# Patient Record
(demographics unavailable — no encounter records)

---

## 2025-06-13 NOTE — HISTORY OF PRESENT ILLNESS
[FreeTextEntry1] : Ms. Gold is a 49 year old female who is here for an evaluation of his rectal pain and occasional blood in stool for the last 3 months. Was seen at [facility] on 6/10/25 and was instructed to follow up with CRS for possible excision and biopsy.  Previously saw Dr. Jhaveri (GI) from [facility], who performed a sigmoidoscopy 6/10/25 and was advised to follow up with colorectal surgeon for surgical management.

## 2025-06-13 NOTE — HISTORY OF PRESENT ILLNESS
[FreeTextEntry1] : Ms. Gold is a 49 year old female who is here for an evaluation of his rectal pain and occasional blood in stool for the last 3 months. Was seen at [facility] on 6/10/25 and was instructed to follow up with CRS for possible excision and biopsy.  Previously saw Dr. Jhaveri (GI) from [facility], who performed a sigmoidoscopy 6/10/25 and was advised to follow up with colorectal surgeon for surgical management.

## 2025-06-13 NOTE — PHYSICAL EXAM
[Posterior] : posteriorly [Skin Tags] : residual hemorrhoidal skin tags were noted [Normal Breath Sounds] : Normal breath sounds [No Rash or Lesion] : No rash or lesion [Alert] : alert [Calm] : calm [Abdomen Masses] : No abdominal masses [Abdomen Tenderness] : ~T No ~M abdominal tenderness [JVD] : no jugular venous distention  [de-identified] : no acute distress

## 2025-06-13 NOTE — PHYSICAL EXAM
[Posterior] : posteriorly [Skin Tags] : residual hemorrhoidal skin tags were noted [Normal Breath Sounds] : Normal breath sounds [No Rash or Lesion] : No rash or lesion [Alert] : alert [Calm] : calm [Abdomen Masses] : No abdominal masses [Abdomen Tenderness] : ~T No ~M abdominal tenderness [JVD] : no jugular venous distention  [de-identified] : no acute distress

## 2025-06-13 NOTE — ASSESSMENT
[FreeTextEntry1] : Ms. Gold is a 49 year old female who is here for the evaluation of her rectal pain and bleeding. 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   Discussed management options for anal fissure including medical management with stool softeners, increased fluid intake, and topical compound cream with lidocaine and nifedipine. If medical management is not successful, surgical options include Botox injection for chemical denervation of the anal sphincter as well as lateral internal sphincterotomy.  Given the findings today on exam and chronicity of symptoms recommend starting with medical management of anal fissure Recommend daily stool softeners such as Colace, Senna or Dulcolax, 6-8 glasses of noncaffeinated beverages daily and twice daily application of lidocaine/nifedipine compound.  Should expect symptoms to improve over next two weeks. Initial medical treatment lasts 6-8 weeks, then will reassess for healing. May take two rounds of medical treatment to heal completely. If fissure has not healed at that point would recommend Botox treatment or lateral internal sphincterotomy.  Will return for follow up in 6-8 weeks to reassess. Discussed that if symptoms do not improve in first week or two should return sooner to readdress care and consider surgical options for quicker resolution.   
no

## 2025-06-13 NOTE — PHYSICAL EXAM
[Posterior] : posteriorly [Skin Tags] : residual hemorrhoidal skin tags were noted [Normal Breath Sounds] : Normal breath sounds [No Rash or Lesion] : No rash or lesion [Alert] : alert [Calm] : calm [Abdomen Masses] : No abdominal masses [Abdomen Tenderness] : ~T No ~M abdominal tenderness [JVD] : no jugular venous distention  [de-identified] : no acute distress

## 2025-06-13 NOTE — HISTORY OF PRESENT ILLNESS
[FreeTextEntry1] : Ms. Gold is a 49 year old female who is here for an evaluation of his rectal pain and occasional blood in stool for the last 3 months. Was seen at [facility] on 6/10/25 and was instructed to follow up with CRS for possible excision and biopsy.  Previously saw Dr. Jhaveri (GI) from [facility], who performed a sigmoidoscopy 6/10/25 and was advised to follow up with colorectal surgeon for surgical management.

## 2025-06-25 NOTE — ASSESSMENT
[FreeTextEntry1] : Ms. Gold is a 49 year old female who is here for a follow up on her painful anal fissure.   Due to the chronicity of her symptoms, I recommend exam under anesthesia with lateral internal sphincterotomy.  Risks and benefits of lateral internal sphincterotomy discussed. Risks include incontinence to flatus. Other risks include post-procedure pain, infection, bleeding, delayed wound healing.  Will schedule patient for exam under anesthesia with lateral internal sphincterotomy.  Preoperative instructions and post-operative information sheet provided.

## 2025-06-25 NOTE — HISTORY OF PRESENT ILLNESS
[FreeTextEntry1] : Ms. Gold is a 49 year old female who is here for a follow up on a posterior anal fissure. Patient feels no relief after medical management with topical lidocaine/nifedipine ointment. Still having pain and bleeding with each BM and is affecting her day to day life.  Detail Level: Detailed Depth Of Biopsy: dermis Was A Bandage Applied: Yes Size Of Lesion In Cm: 0.7 X Size Of Lesion In Cm: 0 Biopsy Type: H and E Biopsy Method: sterile single edge surgical blade Anesthesia Type: 1% lidocaine with epinephrine and a 1:10 solution of 8.4% sodium bicarbonate Anesthesia Volume In Cc (Will Not Render If 0): 0.5 Hemostasis: Electrocautery Wound Care: Aquaphor Dressing: bandage Destruction After The Procedure: No Type Of Destruction Used: Curettage Curettage Text: The wound bed was treated with curettage after the biopsy was performed. Cryotherapy Text: The wound bed was treated with cryotherapy after the biopsy was performed. Electrodesiccation Text: The wound bed was treated with electrodesiccation after the biopsy was performed. Electrodesiccation And Curettage Text: The wound bed was treated with electrodesiccation and curettage after the biopsy was performed. Silver Nitrate Text: The wound bed was treated with silver nitrate after the biopsy was performed. Lab: [id number] Consent: Written consent was obtained and risks were reviewed including but not limited to scarring, infection, bleeding, scabbing, incomplete removal, nerve damage and allergy to anesthesia. Post-Care Instructions: I reviewed with the patient in detail post-care instructions. Patient is to keep the biopsy site dry overnight, and then apply bacitracin twice daily until healed. Patient may apply hydrogen peroxide soaks to remove any crusting. Notification Instructions: Patient will be notified of biopsy results. However, patient instructed to call the office if not contacted within 2 weeks. Billing Type: [employer] Information: Selecting Yes will display possible errors in your note based on the variables you have selected. This validation is only offered as a suggestion for you. PLEASE NOTE THAT THE VALIDATION TEXT WILL BE REMOVED WHEN YOU FINALIZE YOUR NOTE. IF YOU WANT TO FAX A PRELIMINARY NOTE YOU WILL NEED TO TOGGLE THIS TO 'NO' IF YOU DO NOT WANT IT IN YOUR FAXED NOTE. Billing Type: Third-Party Bill

## 2025-06-25 NOTE — PHYSICAL EXAM
[Posterior] : posteriorly [Skin Tags] : residual hemorrhoidal skin tags were noted [Normal Breath Sounds] : Normal breath sounds [No Rash or Lesion] : No rash or lesion [Alert] : alert [Calm] : calm [Abdomen Masses] : No abdominal masses [Abdomen Tenderness] : ~T No ~M abdominal tenderness [Normal] : was normal [JVD] : no jugular venous distention  [de-identified] : no acute distress

## 2025-06-25 NOTE — PHYSICAL EXAM
[Posterior] : posteriorly [Skin Tags] : residual hemorrhoidal skin tags were noted [Normal Breath Sounds] : Normal breath sounds [No Rash or Lesion] : No rash or lesion [Alert] : alert [Calm] : calm [Abdomen Masses] : No abdominal masses [Abdomen Tenderness] : ~T No ~M abdominal tenderness [Normal] : was normal [JVD] : no jugular venous distention  [de-identified] : no acute distress

## 2025-06-25 NOTE — HISTORY OF PRESENT ILLNESS
[FreeTextEntry1] : Ms. Gold is a 49 year old female who is here for a follow up on a posterior anal fissure. Patient feels no relief after medical management with topical lidocaine/nifedipine ointment. Still having pain and bleeding with each BM and is affecting her day to day life.

## 2025-07-22 NOTE — PHYSICAL EXAM
[Abdomen Masses] : No abdominal masses [Abdomen Tenderness] : ~T No ~M abdominal tenderness [JVD] : no jugular venous distention  [Normal Breath Sounds] : Normal breath sounds [No Rash or Lesion] : No rash or lesion [Alert] : alert [Calm] : calm [de-identified] : surgical incision c/d/i, posterior anal fissure near completely healed [de-identified] : no acute distress

## 2025-07-22 NOTE — HISTORY OF PRESENT ILLNESS
[FreeTextEntry1] : Ms. Gold is a 49 year old female who underwent a  EUA with lateral internal sphincterotomy and excision of anal polyp on 7/2/25 with Dr. Ortiz. 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  Pt is feeling well. Reports that her fissure pain has improved. Denies fever, chills, or purulent drainage from surgical incisions. Did have some scant bleeding with BM but had hard stool during those episodes.

## 2025-07-22 NOTE — PHYSICAL EXAM
[Abdomen Masses] : No abdominal masses [Abdomen Tenderness] : ~T No ~M abdominal tenderness [JVD] : no jugular venous distention  [Normal Breath Sounds] : Normal breath sounds [No Rash or Lesion] : No rash or lesion [Alert] : alert [Calm] : calm [de-identified] : surgical incision c/d/i, posterior anal fissure near completely healed [de-identified] : no acute distress

## 2025-07-22 NOTE — ASSESSMENT
[FreeTextEntry1] : Ms. Gold is a 49 year old female who underwent a  EUA with lateral internal sphincterotomy and excision of anal polyp on 7/2/25 with Dr. Ortiz.  Patient's surgical incision is healing well. On today's examination, her posterior midline anal fissure is near completely healed.  Pt instructed to use stool softener as she is having issues with constipation. Pt invited to follow up in 4 weeks if needed.